# Patient Record
Sex: MALE | Race: WHITE | NOT HISPANIC OR LATINO | Employment: OTHER | URBAN - METROPOLITAN AREA
[De-identification: names, ages, dates, MRNs, and addresses within clinical notes are randomized per-mention and may not be internally consistent; named-entity substitution may affect disease eponyms.]

---

## 2023-12-13 ENCOUNTER — OFFICE VISIT (OUTPATIENT)
Dept: URGENT CARE | Facility: URGENT CARE | Age: 63
End: 2023-12-13
Payer: COMMERCIAL

## 2023-12-13 VITALS
HEART RATE: 80 BPM | DIASTOLIC BLOOD PRESSURE: 79 MMHG | WEIGHT: 200 LBS | TEMPERATURE: 97.7 F | SYSTOLIC BLOOD PRESSURE: 125 MMHG | BODY MASS INDEX: 30.31 KG/M2 | OXYGEN SATURATION: 98 % | HEIGHT: 68 IN

## 2023-12-13 DIAGNOSIS — R51.9 SCALP TENDERNESS: ICD-10-CM

## 2023-12-13 DIAGNOSIS — L66.3: Primary | ICD-10-CM

## 2023-12-13 PROCEDURE — 99204 OFFICE O/P NEW MOD 45 MIN: CPT | Performed by: NURSE PRACTITIONER

## 2023-12-13 RX ORDER — CEPHALEXIN 500 MG/1
500 CAPSULE ORAL 3 TIMES DAILY
Qty: 21 CAPSULE | Refills: 0 | Status: SHIPPED | OUTPATIENT
Start: 2023-12-13 | End: 2023-12-20

## 2023-12-13 NOTE — PROGRESS NOTES
"Chief Complaint   Patient presents with    Urgent Care     X1 month of pain on top of head, slight bump on head      SUBJECTIVE:  Arley Dobbs is a 63 year old male who presents to the clinic today with a scalp concern over the last 3 days flaring up.  He initially noticed it about a month ago with a painful slightly red swollen soft tender spot midline parietal region over his bald spot that went away. His son says that yesterday there was about an inch long area of dark red streak that is gone now today.  No numbness tingling shooting or vesicles such as shingles.  Patient does have a relevant past medical history of DVTs and stopped Xarelto Eliquis months ago due to side effects, he is mostly concerned about blood clots.  He has been taking daily aspirin.  Declines fevers sweats chills nausea vomiting headache confusion other symptoms.  His primary care was in [facility], he is here in the [facility] with his son for a week who recently had a kidney transplant.    No past medical history on file.  No current outpatient medications on file prior to visit.  No current facility-administered medications on file prior to visit.    Social History     Tobacco Use    Smoking status: Never    Smokeless tobacco: Never   Substance Use Topics    Alcohol use: Not on file     Allergies   Allergen Reactions    Lanolin Rash       Review of Systems  All systems negative except those listed above in HPI.    EXAM:   /79   Pulse 80   Temp 97.7  F (36.5  C) (Temporal)   Ht 1.727 m (5' 8\")   Wt 90.7 kg (200 lb)   SpO2 98%   BMI 30.41 kg/m      Physical Exam  Vitals reviewed.   Constitutional:       Appearance: Normal appearance.   HENT:      Head:        Comments: 2 cm area of faint soft tender erythema without nodule mass     Nose: Nose normal.      Mouth/Throat:      Mouth: Mucous membranes are moist.      Pharynx: Oropharynx is clear.   Eyes:      Extraocular Movements: Extraocular movements intact.      " Conjunctiva/sclera: Conjunctivae normal.      Pupils: Pupils are equal, round, and reactive to light.   Cardiovascular:      Rate and Rhythm: Normal rate.      Pulses: Normal pulses.   Pulmonary:      Effort: Pulmonary effort is normal.   Musculoskeletal:         General: Normal range of motion.      Cervical back: Normal range of motion and neck supple.   Skin:     General: Skin is warm and dry.      Findings: Erythema and lesion present. No rash.   Neurological:      General: No focal deficit present.      Mental Status: He is alert and oriented to person, place, and time.   Psychiatric:         Mood and Affect: Mood normal.         Behavior: Behavior normal.       ASSESSMENT:    ICD-10-CM    1. Cellulitis, scalp, dissecting  L66.3 cephALEXin (KEFLEX) 500 MG capsule      2. Scalp tenderness  R51.9         PLAN:    Dissecting scalp cellulitis most likely etiology given erythema warmth tenderness soft nature  Considered other masses such as cysts lipomas as well as superficial thrombophlebitis lymphangitis shingles  Urgent care is quite limited without advanced imaging  Recommend starting Keflex rotating ice heat  Continue aspirin and reach out to PCP regarding anticoagulation  Needs PCP follow-up if lingering to discuss advanced imaging potential biopsy  ER if severe worsening in discoloration pain headache vision change confusion shortness of breath weakness    Follow up with primary care provider with any problems, questions or concerns or if symptoms worsen or fail to improve. Patient agreed to plan and verbalized understanding.    JAYDE Lovelace-BC  [facility]